# Patient Record
Sex: FEMALE | Race: WHITE | NOT HISPANIC OR LATINO | Employment: STUDENT | URBAN - METROPOLITAN AREA
[De-identification: names, ages, dates, MRNs, and addresses within clinical notes are randomized per-mention and may not be internally consistent; named-entity substitution may affect disease eponyms.]

---
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
## 2023-10-24 ENCOUNTER — OFFICE VISIT (OUTPATIENT)
Dept: PEDIATRIC NEUROLOGY | Facility: CLINIC | Age: 18
End: 2023-10-24
Payer: COMMERCIAL

## 2023-10-24 VITALS
HEIGHT: 64 IN | BODY MASS INDEX: 26.72 KG/M2 | DIASTOLIC BLOOD PRESSURE: 78 MMHG | WEIGHT: 156.5 LBS | SYSTOLIC BLOOD PRESSURE: 110 MMHG

## 2023-10-24 DIAGNOSIS — R51.9 FREQUENT HEADACHES: Primary | ICD-10-CM

## 2023-10-24 PROCEDURE — 99214 PR OFFICE/OUTPT VISIT, EST, LEVL IV, 30-39 MIN: ICD-10-PCS | Mod: S$GLB,,, | Performed by: PSYCHIATRY & NEUROLOGY

## 2023-10-24 PROCEDURE — 3074F PR MOST RECENT SYSTOLIC BLOOD PRESSURE < 130 MM HG: ICD-10-PCS | Mod: CPTII,S$GLB,, | Performed by: PSYCHIATRY & NEUROLOGY

## 2023-10-24 PROCEDURE — 3074F SYST BP LT 130 MM HG: CPT | Mod: CPTII,S$GLB,, | Performed by: PSYCHIATRY & NEUROLOGY

## 2023-10-24 PROCEDURE — 99214 OFFICE O/P EST MOD 30 MIN: CPT | Mod: S$GLB,,, | Performed by: PSYCHIATRY & NEUROLOGY

## 2023-10-24 PROCEDURE — 99999 PR PBB SHADOW E&M-EST. PATIENT-LVL III: ICD-10-PCS | Mod: PBBFAC,,, | Performed by: PSYCHIATRY & NEUROLOGY

## 2023-10-24 PROCEDURE — 3078F DIAST BP <80 MM HG: CPT | Mod: CPTII,S$GLB,, | Performed by: PSYCHIATRY & NEUROLOGY

## 2023-10-24 PROCEDURE — 3008F BODY MASS INDEX DOCD: CPT | Mod: CPTII,S$GLB,, | Performed by: PSYCHIATRY & NEUROLOGY

## 2023-10-24 PROCEDURE — 3078F PR MOST RECENT DIASTOLIC BLOOD PRESSURE < 80 MM HG: ICD-10-PCS | Mod: CPTII,S$GLB,, | Performed by: PSYCHIATRY & NEUROLOGY

## 2023-10-24 PROCEDURE — 99999 PR PBB SHADOW E&M-EST. PATIENT-LVL III: CPT | Mod: PBBFAC,,, | Performed by: PSYCHIATRY & NEUROLOGY

## 2023-10-24 PROCEDURE — 1159F PR MEDICATION LIST DOCUMENTED IN MEDICAL RECORD: ICD-10-PCS | Mod: CPTII,S$GLB,, | Performed by: PSYCHIATRY & NEUROLOGY

## 2023-10-24 PROCEDURE — 1159F MED LIST DOCD IN RCRD: CPT | Mod: CPTII,S$GLB,, | Performed by: PSYCHIATRY & NEUROLOGY

## 2023-10-24 PROCEDURE — 3008F PR BODY MASS INDEX (BMI) DOCUMENTED: ICD-10-PCS | Mod: CPTII,S$GLB,, | Performed by: PSYCHIATRY & NEUROLOGY

## 2023-10-24 RX ORDER — CLINDAMYCIN PHOSPHATE 11.9 MG/ML
SOLUTION TOPICAL
COMMUNITY
Start: 2023-09-07

## 2023-10-24 RX ORDER — TRETINOIN 0.25 MG/G
CREAM TOPICAL
COMMUNITY
Start: 2023-10-06

## 2023-10-24 RX ORDER — PROPRANOLOL HYDROCHLORIDE 40 MG/1
TABLET ORAL
Qty: 60 TABLET | Refills: 2 | Status: SHIPPED | OUTPATIENT
Start: 2023-10-24 | End: 2023-12-22 | Stop reason: SDUPTHER

## 2023-10-24 RX ORDER — SPIRONOLACTONE 100 MG/1
100 TABLET, FILM COATED ORAL
COMMUNITY
Start: 2023-10-06

## 2023-10-24 NOTE — PROGRESS NOTES
"  Subjective:      Patient ID: Deonna Smith is a 18 y.o. female.    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   Repeat MRI brain stable see below    Still with some fatigue     Was taking Mg an B2 for headaches    It seemed to help at first   Now things are worse   The headaches just seem to gradually get worse over time     She has more bad days lately   She has weird symptoms getting worse over time   They feel this is related to the residual tumor   She has to rest a lot   Some days can't get out of bed to go to school   But sometimes has to get accommodations    College at    Has to miss school     Does not really want to take medication if it will make her more tired   The fatigue is still debilitating   The ADHD medication might be helping some     She is taking adderall 30 mg short acting   She feels it lasts until late afteroon  Says she is a slow metabolizer   It seems to help her some with energy and focus     Clumsy left hand seems to not be a problem lately   Still has jaw and ear pain with bad headache   And would seem like face looked swollen       Records reviewed:    MRI [facility] in Feb 2023: "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 Stable T2 hyperintense nodule within the left superior cerebellum as compared to multiple prior exams dating back to 8/18/2021."      Mom states MRI in august 2021 at [facility] noted a new satellite nodule beside the primary tumor  Then did again in February and did not note the nodule, but increased enhancement of the primary tumor   The Oncologist said they would still continue to follow  Nothing else to do at this point     At [facility] was seen by endocrine and thyroid finding noted to be " "cysts  Symptoms were not thought to be due to the thyroid  Still follows with them    At times she has more symptoms that are migraine like     Repeat MRI in [place] September 2020 was unchanged by report    MRI at [facility] November 2019 no change     EEG is abnormal October 2019: There is left central temporal occipital sharp activity noted consistent with a focal potentially epileptogenic process in that region.     MRI in [place] Feb 2019: "mixed solid and cystic intra axial mass arising from the midbrain left of midline about 2.8 cm with extension into fourth ventricle.  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     I saw her as new patient Jan 30, 2019  (I had seen her in the past age 2 for suspected migraine variant with visual changes and had normal CT and EEG)     [facility]     Sees ophtho and neuro in [place] also     She was tried on topamax and then keppra for episodes of possible dystonia of hand vs seizure  She stopped both meds due to side effects  Continues to complain about fatigue  Never any definite seizures but we recommended making careful decisions about driving   Failed amitriptyline      Saw Dr Barry cardiology about concerns about BP and HR and presyncope       Review of Systems   Constitutional: Negative.    HENT: Negative.     Cardiovascular: Negative.    Gastrointestinal: Negative.    Allergic/Immunologic: Negative.    Hematological: Negative.         Objective:     Physical Exam  Constitutional:       General: She is not in " acute distress.     Appearance: Normal appearance.   HENT:      Head: Normocephalic and atraumatic.      Mouth/Throat:      Mouth: Mucous membranes are moist.   Eyes:      Conjunctiva/sclera: Conjunctivae normal.   Cardiovascular:      Rate and Rhythm: Normal rate and regular rhythm.   Pulmonary:      Effort: Pulmonary effort is normal. No respiratory distress.   Abdominal:      General: Abdomen is flat.      Palpations: Abdomen is soft.   Musculoskeletal:         General: No swelling or tenderness.      Cervical back: Normal range of motion. No rigidity.   Skin:     General: Skin is warm and dry.      Findings: No rash.   Neurological:      Mental Status: She is alert.      Cranial Nerves: No cranial nerve deficit.      Motor: No weakness.      Coordination: Coordination normal.      Gait: Gait normal.      Deep Tendon Reflexes: Reflexes normal.     Mild disarticulation at baseline  Conversational and appropriate       Assessment:     Patient with brainstem tumor (JPA), left midbrain, s/p subtotal resection (exophytic enhancing portion only) at [facility] in Feb 2019. Has had  episodes of intermittent right hand clumsiness followed by tiredness. Also intermittent inability to adduct left eye. Was being treated for presumed seizures but patient wanted to stop the seizure medication, because had side effects and seizures never clear. Now with concerns about BP and heart rate and presyncope. She has recently started to have some changes on MRI of brainstem starting in 2021 and [facility] is planning to follow with no further intervention.    Plan:   They would like to try propranolol for worsening headaches  They will get BP check at [facility]D after starting it   Will see her in video visit in 2 mos   Will try to see an adult headache specialist in [place], either at [facility] or [facility]   Continue to follow at [facility]  Call if any new neurological symptoms  Video visit in 2 mos

## 2023-12-22 ENCOUNTER — OFFICE VISIT (OUTPATIENT)
Dept: PEDIATRIC NEUROLOGY | Facility: CLINIC | Age: 18
End: 2023-12-22
Payer: COMMERCIAL

## 2023-12-22 DIAGNOSIS — R51.9 FREQUENT HEADACHES: ICD-10-CM

## 2023-12-22 PROCEDURE — 99214 OFFICE O/P EST MOD 30 MIN: CPT | Mod: 95,,, | Performed by: PSYCHIATRY & NEUROLOGY

## 2023-12-22 PROCEDURE — 99214 PR OFFICE/OUTPT VISIT, EST, LEVL IV, 30-39 MIN: ICD-10-PCS | Mod: 95,,, | Performed by: PSYCHIATRY & NEUROLOGY

## 2023-12-22 RX ORDER — RIZATRIPTAN BENZOATE 5 MG/1
TABLET, ORALLY DISINTEGRATING ORAL
Qty: 9 TABLET | Refills: 1 | Status: SHIPPED | OUTPATIENT
Start: 2023-12-22

## 2023-12-22 RX ORDER — PROPRANOLOL HYDROCHLORIDE 40 MG/1
TABLET ORAL
Qty: 30 TABLET | Refills: 2 | Status: SHIPPED | OUTPATIENT
Start: 2023-12-22 | End: 2024-03-25

## 2023-12-22 NOTE — PROGRESS NOTES
"Subjective:      Patient ID: Deonna Smith is a 18 y.o. female.    HPI      Today's visit is being performed via video visit. I have confirmed that the patient is currently located in the [facility] at home. The participants of this video visit are Deonna Smith, mom and myself.    CC: brain tumor    Here with mom  History obtained from them     Last visit October  Started propranolol for headaches that were worsening  Was to get BP check with pmd    Was planning to try to see adult neuro at [facility]    They said they didn't want to treat her unless they were treating the tumor  Said they didn't want to talk about the headaches     Propranolol was working   All the medications seem to work at first   Then stop working   PMD said to take only half dose due to BP and HR  So she is only taking 20 mg bid    Adderall 30 short acting per pmd   For fatigue and focus    Still has jaw and ear pain with bad headache   And would seem like face looked swollen     Overall they are happy to continue the propranolol  Still some days she cannot get out of bed   Still requires a lot of sleep     Has maxalt for rescue and it helps    No new symptoms    Records reviewed:     MRI [facility] in Feb 2023: "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 Stable T2 hyperintense nodule within the left superior cerebellum as compared to multiple prior exams dating back to 8/18/2021."      Mom states MRI in august 2021 at [facility] noted a new satellite nodule beside the primary tumor  Then did again in February and did not note the nodule, but increased enhancement of the primary tumor   The Oncologist said they would still continue to follow  Nothing else to do at this point     At [facility] was seen by endocrine and thyroid finding noted to be cysts  Symptoms were not thought to be due to the thyroid  Still follows with " "them    At times she has more symptoms that are migraine like     Repeat MRI in [place] September 2020 was unchanged by report    MRI at [facility] November 2019 no change     EEG is abnormal October 2019: There is left central temporal occipital sharp activity noted consistent with a focal potentially epileptogenic process in that region.     MRI in [place] Feb 2019: "mixed solid and cystic intra axial mass arising from the midbrain left of midline about 2.8 cm with extension into fourth ventricle.  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     I saw her as new patient Jan 30, 2019  (I had seen her in the past age 2 for suspected migraine variant with visual changes and had normal CT and EEG)     Has also seen Dr Solorio at [facility]     Sees ophtho and neuro in [place] also     She was tried on topamax and then keppra for episodes of possible dystonia of hand vs seizure  She stopped both meds due to side effects  Continues to complain about fatigue  Never any definite seizures but we recommended making careful decisions about driving   Failed amitriptyline      Saw Dr Barry cardiology about concerns about BP and HR and presyncope        Review of Systems   Constitutional: Negative.    HENT: Negative.     Cardiovascular: Negative.    Gastrointestinal: Negative.    Allergic/Immunologic: Negative.    Hematological: Negative.         Objective:     Weight reported: 156 lb or less  Conversational   Minimal dysarthria at baseline      Assessment:     Patient with brainstem tumor (JPA), " left midbrain, s/p subtotal resection (exophytic enhancing portion only) at [facility] in Feb 2019. Has had  episodes of intermittent right hand clumsiness followed by tiredness. Also intermittent inability to adduct left eye. Was being treated for presumed seizures but patient wanted to stop the seizure medication, because had side effects and seizures never clear. Now with concerns about BP and heart rate and presyncope. She has recently started to have some changes on MRI of brainstem starting in 2021 and [facility] is planning to follow with no further intervention.     Plan:   23 minute video visit   Will continue low dose propranolol 20 mg bid   Will try to get in with adult headache specialist in [place] to establish care  Has maxalt for rescue  To ER for any sudden worsening to make sure no recurrence of hydrocephalus  Continue to follow with [facility]   Return in 6 mos but planning to establish with adult neuro also

## 2024-03-23 DIAGNOSIS — R51.9 FREQUENT HEADACHES: ICD-10-CM

## 2024-03-25 RX ORDER — PROPRANOLOL HYDROCHLORIDE 40 MG/1
TABLET ORAL
Qty: 180 TABLET | Refills: 2 | Status: SHIPPED | OUTPATIENT
Start: 2024-03-25

## 2024-09-23 DIAGNOSIS — G43.909 MIGRAINES: Primary | ICD-10-CM

## 2025-01-14 ENCOUNTER — OFFICE VISIT (OUTPATIENT)
Dept: NEUROLOGY | Facility: CLINIC | Age: 20
End: 2025-01-14
Payer: COMMERCIAL

## 2025-01-14 VITALS
SYSTOLIC BLOOD PRESSURE: 110 MMHG | BODY MASS INDEX: 23.92 KG/M2 | HEIGHT: 63 IN | WEIGHT: 135 LBS | DIASTOLIC BLOOD PRESSURE: 60 MMHG

## 2025-01-14 DIAGNOSIS — R55 POSTURAL DIZZINESS WITH PRESYNCOPE: ICD-10-CM

## 2025-01-14 DIAGNOSIS — R42 POSTURAL DIZZINESS WITH PRESYNCOPE: ICD-10-CM

## 2025-01-14 DIAGNOSIS — R41.840 ATTENTION OR CONCENTRATION DEFICIT: ICD-10-CM

## 2025-01-14 DIAGNOSIS — G43.909 MIGRAINES: Primary | ICD-10-CM

## 2025-01-14 DIAGNOSIS — C71.9 JUVENILE PILOCYTIC ASTROCYTOMA: ICD-10-CM

## 2025-01-14 PROCEDURE — 99204 OFFICE O/P NEW MOD 45 MIN: CPT | Mod: S$GLB,,, | Performed by: SPECIALIST

## 2025-01-14 PROCEDURE — 3074F SYST BP LT 130 MM HG: CPT | Mod: CPTII,S$GLB,, | Performed by: SPECIALIST

## 2025-01-14 PROCEDURE — 1159F MED LIST DOCD IN RCRD: CPT | Mod: CPTII,S$GLB,, | Performed by: SPECIALIST

## 2025-01-14 PROCEDURE — 99999 PR PBB SHADOW E&M-EST. PATIENT-LVL III: CPT | Mod: PBBFAC,,, | Performed by: SPECIALIST

## 2025-01-14 PROCEDURE — 3078F DIAST BP <80 MM HG: CPT | Mod: CPTII,S$GLB,, | Performed by: SPECIALIST

## 2025-01-14 PROCEDURE — 3008F BODY MASS INDEX DOCD: CPT | Mod: CPTII,S$GLB,, | Performed by: SPECIALIST

## 2025-01-14 NOTE — PROGRESS NOTES
"Subjective:      @Patient ID: Deonna Smith is a 19 y.o. female.    Chief Complaint/referral reason/HPI:   Chief Complaint   Patient presents with    Migraine     New pt presents to clinic w mother ref by Dr Meredith. Pt has been having cluster ha's everyday describes them as a pressure pain between eyes, radiates down to jaw, one side of face swells noticeably larger when gets ha's.Pt exp 4 migraines a month has light sensitivity, occ nausea, sound sensitivity. Pt takes maxalt or ovc meds, but still present w ha's everyday.       Bennie NOONAN hx comments:  She's had sleep apnea testing and was negative   Bruxes;   wears a retainer     See also 'facesheet' under media for handwritten notes     Review of Systems         Marital status:   Student in business   Drives        -------------------------------------    ADD (attention deficit disorder)    Pilocytic astrocytoma    Seizures    previously on Topamax for possible seizures or dystonia    Syncope and collapse     ..  Current Outpatient Medications   Medication Instructions    clindamycin (CLEOCIN T) 1 % external solution SMARTSIG:sparingly Topical Every Morning    dextroamphetamine-amphetamine 30 mg Tab Daily    l-methylfolate-b2-b6-b12 (CEREFOLIN) 6-5-50-1 mg Tab 1 tablet, Daily    magnesium oxide (MAG-OX) 400 mg, Daily    omeprazole (PRILOSEC) 20 mg, Daily    rizatriptan (MAXALT-MLT) 5 MG disintegrating tablet 1 po at start of headache    spironolactone (ALDACTONE) 100 mg    tretinoin (RETIN-A) 0.025 % cream SMARTSIG:sparingly Topical Every Night      Objective:      Exam:   Visit Vitals  /60 (BP Location: Left arm, Patient Position: Sitting)   Ht 5' 3" (1.6 m)   Wt 61.2 kg (135 lb)   BMI 23.91 kg/m²     General Exam  If Accompanied, by__       body habitus_ Body mass index is 23.91 kg/m².  Gen exam     Neurological:  Exam comments:  CN's: vf's eom's ok. mild face asymmetry   Speech: sounds ok   Motor: no deficits observed   Coord: F to N ok "   Reflexes: ok   Plantars: ok   Sensation: ok   Gait: ok       Neuroimaging:  Images and imaging reports reviewed.   Rads summary:  My comments: remote imaging reviewed but more recent images were not initially available     Labs:    New Patient             Complexity of Data:     [x] High  [] Moderate   Risks:   [] High   [] Moderate   MDM:      [] High   [x] Moderate         Assessment/Plan:         ICD-10-CM ICD-9-CM   1. Migraines  G43.909 346.90   2. Juvenile pilocytic astrocytoma  C71.9 191.9   3. Postural dizziness with presyncope  R42 780.4    R55 780.2   4. Attention or concentration deficit  R41.840 799.51         Other Comments / Follow Up:      Apologies I do not have great ideas on what she could take for the daily headache nor do I recommend any additional migraine prevention medication     F to f fu in April               Jeet Reynolds MD [facility], [facility]  Neuroscience Center Medical Director   [facility]

## 2025-04-07 ENCOUNTER — OFFICE VISIT (OUTPATIENT)
Dept: NEUROLOGY | Facility: CLINIC | Age: 20
End: 2025-04-07
Payer: COMMERCIAL

## 2025-04-07 VITALS
HEIGHT: 63 IN | SYSTOLIC BLOOD PRESSURE: 111 MMHG | DIASTOLIC BLOOD PRESSURE: 72 MMHG | BODY MASS INDEX: 23.92 KG/M2 | WEIGHT: 135 LBS

## 2025-04-07 DIAGNOSIS — R42 POSTURAL DIZZINESS WITH PRESYNCOPE: ICD-10-CM

## 2025-04-07 DIAGNOSIS — C71.9 JUVENILE PILOCYTIC ASTROCYTOMA: ICD-10-CM

## 2025-04-07 DIAGNOSIS — R55 POSTURAL DIZZINESS WITH PRESYNCOPE: ICD-10-CM

## 2025-04-07 DIAGNOSIS — G43.109 MIGRAINE WITH AURA AND WITHOUT STATUS MIGRAINOSUS, NOT INTRACTABLE: ICD-10-CM

## 2025-04-07 DIAGNOSIS — R51.9 CHRONIC DAILY HEADACHE: Primary | ICD-10-CM

## 2025-04-07 PROCEDURE — 99999 PR PBB SHADOW E&M-EST. PATIENT-LVL III: CPT | Mod: PBBFAC,,, | Performed by: SPECIALIST

## 2025-04-07 RX ORDER — DULOXETIN HYDROCHLORIDE 20 MG/1
20 CAPSULE, DELAYED RELEASE ORAL DAILY
Qty: 30 CAPSULE | Refills: 11 | Status: SHIPPED | OUTPATIENT
Start: 2025-04-07 | End: 2026-04-07

## 2025-04-07 NOTE — PROGRESS NOTES
"Subjective:         Patient ID: Deonna Smith is a 19 y.o. female.    Chief Complaint/HPI:   Chief Complaint   Patient presents with    Migraine     Symptoms are the same from last visit pain in between eyes radiate to jaw. MRI imaging received         Addn HPI:          Mom said folks at [facility] wanted them to talk with us about dysautonomia / POTS        3.2025 Stable residual tumor in the brainstem. No leptomeningeal metastasis [facility] report     1.2025 1st vis [facility]; no orders  chr HA   2.2024 bMRI [facility] reportedly stable brainstem tumor   2019 pilocytic astrocytoma resection     ...  notes may also be on facesheet for HPI, ROS, and other sections   ROS:              Current Outpatient Medications   Medication Instructions    clindamycin (CLEOCIN T) 1 % external solution SMARTSIG:sparingly Topical Every Morning    dextroamphetamine-amphetamine 30 mg Tab Daily    l-methylfolate-b2-b6-b12 (CEREFOLIN) 6-5-50-1 mg Tab 1 tablet, Daily    magnesium oxide (MAG-OX) 400 mg, Daily    omeprazole (PRILOSEC) 20 mg, Daily    rizatriptan (MAXALT-MLT) 5 MG disintegrating tablet 1 po at start of headache    spironolactone (ALDACTONE) 100 mg    tretinoin (RETIN-A) 0.025 % cream SMARTSIG:sparingly Topical Every Night      Objective:      Exam  /72 (BP Location: Right arm, Patient Position: Sitting)   Ht 5' 3" (1.6 m)   Wt 61.2 kg (135 lb)   BMI 23.91 kg/m²   General:   If Accompanied, by__ mother   heart:   pharynx:  Neurological   Speech: Clear   vis fields: Full    EOMs: Ok   funduscopic:   Motor:  Grossly normal   coord:  Normal   Gait:  Unaided     Neuroimaging:  Brain mri reportedly stable st  last month     Labs:    Complexity of Data:     [] High  [x] Moderate   Risks:   [] High   [] Moderate   MDM:      [] High   [x] Moderate         Assessment/Plan:         ICD-10-CM ICD-9-CM   1. Chronic daily headache  R51.9 784.0   2. Migraine with aura and without status migrainosus, not intractable  G43.109 346.00 "   3. Juvenile pilocytic astrocytoma  C71.9 191.9   4. Postural dizziness with presyncope  R42 780.4    R55 780.2     Other comments/ follow up:        No orders of the defined types were placed in this encounter.     Medications Ordered This Encounter   Medications    DULoxetine (CYMBALTA) 20 MG capsule     Sig: Take 1 capsule (20 mg total) by mouth once daily.     Dispense:  30 capsule     Refill:  11     F to f 6 mos Dr DENICE Reynolds MD CARLA FAAN FAASM